# Patient Record
Sex: FEMALE | Race: ASIAN | URBAN - METROPOLITAN AREA
[De-identification: names, ages, dates, MRNs, and addresses within clinical notes are randomized per-mention and may not be internally consistent; named-entity substitution may affect disease eponyms.]

---

## 2017-01-05 ENCOUNTER — OFFICE VISIT (OUTPATIENT)
Dept: DERMATOLOGY | Facility: CLINIC | Age: 22
End: 2017-01-05

## 2017-01-05 DIAGNOSIS — L70.0 ACNE VULGARIS: Primary | ICD-10-CM

## 2017-01-05 RX ORDER — TRETINOIN 0.25 MG/G
CREAM TOPICAL
Qty: 45 G | Refills: 3 | Status: SHIPPED | OUTPATIENT
Start: 2017-01-05

## 2017-01-05 RX ORDER — AMOXICILLIN 500 MG/1
500 CAPSULE ORAL 2 TIMES DAILY
Qty: 60 CAPSULE | Refills: 2 | Status: SHIPPED | OUTPATIENT
Start: 2017-01-05 | End: 2017-04-13

## 2017-01-05 RX ORDER — CLINDAMYCIN PHOSPHATE 10 UG/ML
LOTION TOPICAL
Qty: 60 ML | Refills: 11 | Status: SHIPPED | OUTPATIENT
Start: 2017-01-05

## 2017-01-05 ASSESSMENT — PAIN SCALES - GENERAL: PAINLEVEL: MODERATE PAIN (5)

## 2017-01-05 NOTE — Clinical Note
Date:January 9, 2017      Patient was self referred, no letter generated. Do not send.        [facility] Information

## 2017-01-05 NOTE — NURSING NOTE
Dermatology Rooming Note    Xiang Pavon's goals for this visit include:   Chief Complaint   Patient presents with     Derm Problem     Xiang is here today for acne.      Prudence Vigil MA

## 2017-01-05 NOTE — Clinical Note
"1/5/2017       RE: Xiang Pavon  [address]  [address]  [facility] [address]     Dear Colleague,    Thank you for referring your patient, Xiang Pavon, to the [facility]ERMATOLOGY [facility]. Please see a copy of my visit note below.    [facility] Dermatology Note    Dermatology Problem List:  -Acne Vulgaris   - clindamycin lotion QAM   - BP wash 5% once daily at night   - tretinoin 0.025% cream QPM   - amoxicillin 500 mg BID     Encounter Date: Jan 5, 2017    CC:   Chief Complaint   Patient presents with     Derm Problem     Xiang is here today for acne.        History of Present Illness:  Xiang Pavon is a 21 year old who presents for evaluation for acne vulgaris.  Patient states that she has had acne for several years, however for the past year it has been much worse.  She states that she used tretinoin 0.05% cream once a day at night, she just started doxycyline 100 mg twice daily.  She states that she only took the doxycyline for 2-3 weeks, but she had dizziness and now has stopped taking doxycyline.  She has used some over the counter medications including an BP wash which she thinks that it is very drying.  She states that her acne is sometimes worse when she has her period.  She thinks that today is a \"good day.\" She states it is only involving her face, not on her chest, back, or jaw line.  She has no family history of acne.  She states that she has no irregular periods.      Past Medical History:   History reviewed. No pertinent past medical history.  History reviewed. No pertinent past surgical history.    Social History:  The patient is a student at the [employer], undergraduate. The patient denies use of tanning beds.    Family History:  No family history of significant acne.    Medications:  No current outpatient prescriptions on file.        No Known Allergies    Review of Systems:  -Skin/Heme New Pt: The patient denies frequent sun exposure. " The patient denies excessive scarring or problems healing except as per HPI. The patient denies excessive bleeding.  -Constitutional: The patient denies fatigue, fevers, chills, unintended weight loss, and night sweats.  -HEENT: Patient denies nonhealing oral sores.    Physical exam:  There were no vitals taken for this visit.  - This is a well developed, well-nourished female in no acute distress, in a pleasant mood.    - Holden's skin type 3-4  - Localized skin examination was performed of  Face, neck, upper chest  and findings as follows:  - multiple inflammatory papules and pustules scattered on the face, forehead, cheeks, and chin, examination revealed no involvement of the jaw as well as the neck and the chest   - No other lesions of concern on areas examined.     Impression/Plan:  1. Acne Vulgaris - moderate to severe nodulocystic acne.  Talked to patient about side effects of medication, counseled her on the following regimen, will have her follow up in 2-3 months.  Consider possibly starting accutane if patient is re-calcitrant.   Morning:  - use clindamycin lotion once daily every morning to entire face (rx)  - take amoxicillin 500 mg twice daily (once in the morning and once at night) (pill, rx)  Evening:  - use Benzoyl peroxide wash (Acne free) 5%  - take amoxicillin again (this is the pill)  - tretinoin 0.025% cream - Apply pea sized amount of medication to face every other day at night for one week, then increase to nightly.  Can apply moisturizer after to decrease irritation.      - after 2 months - decrease amoxicillin to only one time a day  - 3 months follow up with me.    Follow-up 3 months     staffed the patient.    Staff Involved:  Resident(Carmel Tavarez)/Staff(as above)    I have personally examined this patient and agree with Dr. Tavarez's documentation and plan of care. I have reviewed and amended the resident's note above. The documentation accurately reflects my clinical  observations, diagnoses, treatment and follow-up plans.     Marija Bangura MD  Dermatology Staff      Again, thank you for allowing me to participate in the care of your patient.      Sincerely,    Carmel Tavarez MD

## 2017-01-05 NOTE — PROGRESS NOTES
"[facility] Dermatology Note    Dermatology Problem List:  -Acne Vulgaris   - clindamycin lotion QAM   - BP wash 5% once daily at night   - tretinoin 0.025% cream QPM   - amoxicillin 500 mg BID     Encounter Date: Jan 5, 2017    CC:   Chief Complaint   Patient presents with     Derm Problem     Xiang is here today for acne.        History of Present Illness:  Xiang Pavon is a 21 year old who presents for evaluation for acne vulgaris.  Patient states that she has had acne for several years, however for the past year it has been much worse.  She states that she used tretinoin 0.05% cream once a day at night, she just started doxycyline 100 mg twice daily.  She states that she only took the doxycyline for 2-3 weeks, but she had dizziness and now has stopped taking doxycyline.  She has used some over the counter medications including an BP wash which she thinks that it is very drying.  She states that her acne is sometimes worse when she has her period.  She thinks that today is a \"good day.\" She states it is only involving her face, not on her chest, back, or jaw line.  She has no family history of acne.  She states that she has no irregular periods.      Past Medical History:   History reviewed. No pertinent past medical history.  History reviewed. No pertinent past surgical history.    Social History:  The patient is a student at the [employer], undergraduate. The patient denies use of tanning beds.    Family History:  No family history of significant acne.    Medications:  No current outpatient prescriptions on file.        No Known Allergies    Review of Systems:  -Skin/Heme New Pt: The patient denies frequent sun exposure. The patient denies excessive scarring or problems healing except as per HPI. The patient denies excessive bleeding.  -Constitutional: The patient denies fatigue, fevers, chills, unintended weight loss, and night sweats.  -HEENT: Patient denies nonhealing oral sores.    Physical " exam:  There were no vitals taken for this visit.  - This is a well developed, well-nourished female in no acute distress, in a pleasant mood.    - Holden's skin type 3-4  - Localized skin examination was performed of  Face, neck, upper chest  and findings as follows:  - multiple inflammatory papules and pustules scattered on the face, forehead, cheeks, and chin, examination revealed no involvement of the jaw as well as the neck and the chest   - No other lesions of concern on areas examined.     Impression/Plan:  1. Acne Vulgaris - moderate to severe nodulocystic acne.  Talked to patient about side effects of medication, counseled her on the following regimen, will have her follow up in 2-3 months.  Consider possibly starting accutane if patient is re-calcitrant.   Morning:  - use clindamycin lotion once daily every morning to entire face (rx)  - take amoxicillin 500 mg twice daily (once in the morning and once at night) (pill, rx)  Evening:  - use Benzoyl peroxide wash (Acne free) 5%  - take amoxicillin again (this is the pill)  - tretinoin 0.025% cream - Apply pea sized amount of medication to face every other day at night for one week, then increase to nightly.  Can apply moisturizer after to decrease irritation.      - after 2 months - decrease amoxicillin to only one time a day  - 3 months follow up with me.    Follow-up 3 months     staffed the patient.    Staff Involved:  Resident(Carmel Tavarez)/Staff(as above)    I have personally examined this patient and agree with Dr. Tavarez's documentation and plan of care. I have reviewed and amended the resident's note above. The documentation accurately reflects my clinical observations, diagnoses, treatment and follow-up plans.     Marija Bangura MD  Dermatology Staff

## 2017-01-05 NOTE — PATIENT INSTRUCTIONS
For Acne  Morning:  - use clindamycin lotion once daily every morning to entire face (rx)  - take amoxicillin 500 mg twice daily (once in the morning and once at night) (pill, rx)    Evening:  - use Benzoyl peroxide wash (Acne free) 5%  - take amoxicillin again (this is the pill)  - tretinoin 0.025% cream - Apply pea sized amount of medication to face every other day at night for one week, then increase to nightly.  Can apply moisturizer after to decrease irritation.      - after 2 months - decrease amoxicillin to only one time a day  - 3 months follow up with me.

## 2017-01-06 PROBLEM — L70.0 ACNE VULGARIS: Status: ACTIVE | Noted: 2017-01-06

## 2017-04-13 ENCOUNTER — OFFICE VISIT (OUTPATIENT)
Dept: DERMATOLOGY | Facility: CLINIC | Age: 22
End: 2017-04-13

## 2017-04-13 DIAGNOSIS — L70.0 ACNE VULGARIS: Primary | ICD-10-CM

## 2017-04-13 RX ORDER — AMOXICILLIN 500 MG/1
500 CAPSULE ORAL 2 TIMES DAILY
Qty: 60 CAPSULE | Refills: 2 | Status: SHIPPED | OUTPATIENT
Start: 2017-04-13

## 2017-04-13 RX ORDER — TRETINOIN 0.5 MG/G
CREAM TOPICAL AT BEDTIME
Qty: 20 G | Refills: 3 | Status: SHIPPED | OUTPATIENT
Start: 2017-04-13

## 2017-04-13 ASSESSMENT — PAIN SCALES - GENERAL: PAINLEVEL: NO PAIN (0)

## 2017-04-13 NOTE — NURSING NOTE
"Dermatology Rooming Note    Xiang Pavon's goals for this visit include:   Chief Complaint   Patient presents with     Derm Problem     Xiang states she is here to follow up on her acne. She states that is doing \" a little bit better\"     Kenia Rivera CMA  "

## 2017-04-13 NOTE — LETTER
4/13/2017       RE: Xiang Pavon  [address]  [address]  [facility] [address]     Dear Colleague,    Thank you for referring your patient, Xiang Pavon, to the [facility] DERMATOLOGY at [facility]. Please see a copy of my visit note below.    No notes on file    Again, thank you for allowing me to participate in the care of your patient.      Sincerely,    Sully Seymour MD, MD

## 2017-04-13 NOTE — PROGRESS NOTES
"[facility] Dermatology Note      Dermatology Problem List:  1. Acne vulgaris, completed 3 month course of amoxicillin 500 mg po bid with some residual atrophic scarring. No nodulocystic type acne   2. Oily skin    Encounter Date: Apr 13, 2017    CC:   Chief Complaint   Patient presents with     Derm Problem     Xiang states she is here to follow up on her acne. She states that is doing \" a little bit better\"       History of Present Illness:  Ms. Xiang Pavon is a 21 year old female who presents as a follow-up for acne. The patient was last seen 3 months ago when she was started on amoxicillin 500 mg po bid in addition to her topical regimen of clindamycin lotion in AM, BPO wash and tretinoin 0.025% cream at night time. She ran out of her antibiotics on Tuesday. She states that she did not know she was supposed to taper her antibiotics. Tolerates her topical medicine but still has oily shiny skin. No side effects with any of her medications     Past Medical History:   Patient Active Problem List   Diagnosis     Acne vulgaris     Social History:  The patient is currently in school for human resources    Family History:  Not discussed today     Medications:  Current Outpatient Prescriptions   Medication Sig Dispense Refill     tretinoin (RETIN-A) 0.05 % cream Apply topically At Bedtime Apply pea size amount over face 20 g 3     amoxicillin (AMOXIL) 500 MG capsule Take 1 capsule (500 mg) by mouth 2 times daily 60 capsule 2     clindamycin (CLEOCIN T) 1 % lotion Apply topically once every morning 60 mL 11     tretinoin (RETIN-A) 0.025 % cream Apply pea sized amount of medication to face every other day at night for one week, then increase to nightly. 45 g 3        No Known Allergies    Review of Systems:  -Constitutional: The patient denies fatigue, fevers, chills, unintended weight loss, and night sweats.  Feeling usual state of health   -Skin: As above in HPI. No additional skin " concerns.    Physical exam:  Vitals: There were no vitals taken for this visit.  GEN: This is a well developed, well-nourished female in no acute distress, in a pleasant mood.    SKIN: Sun-exposed skin, which includes the head/face, neck, both arms, digits, and/or nails was examined.   -Acneiform scarring is noted on the bilateral cheeks as atrophic pink macules .   - chest and back are clear  - very subtle comedones on forehead   -No other lesions of concern on areas examined.     Impression/Plan:  1. Acne vulgaris, improved     D/c amoxicillin given there is no evidence of inflammatory papules or pustules. There is also no nodulocystic acne on exam today     Continue topical regimen of clindamycin lotion in AM, BPO wash daily    Increase tretinoin concentration to 0.05% cream given oily skin and some persistent comedones     CC PCP/amari on close of this encounter.  Follow-up in 6 months, earlier for new or changing lesions.       Dr. White staffed the patient.    Staff Involved:  Resident(Sully Seymour MD)/Staff(as above)

## 2017-04-13 NOTE — LETTER
"4/13/2017      RE: Xiang Pavon  [address]  [address]  [facility] [address]       [facility] Dermatology Note      Dermatology Problem List:  1. Acne vulgaris, completed 3 month course of amoxicillin 500 mg po bid with some residual atrophic scarring. No nodulocystic type acne   2. Oily skin    Encounter Date: Apr 13, 2017    CC:   Chief Complaint   Patient presents with     Derm Problem     Xiang states she is here to follow up on her acne. She states that is doing \" a little bit better\"       History of Present Illness:  Ms. Xiang Pavon is a 21 year old female who presents as a follow-up for acne. The patient was last seen 3 months ago when she was started on amoxicillin 500 mg po bid in addition to her topical regimen of clindamycin lotion in AM, BPO wash and tretinoin 0.025% cream at night time. She ran out of her antibiotics on Tuesday. She states that she did not know she was supposed to taper her antibiotics. Tolerates her topical medicine but still has oily shiny skin. No side effects with any of her medications     Past Medical History:   Patient Active Problem List   Diagnosis     Acne vulgaris     Social History:  The patient is currently in school for human resources    Family History:  Not discussed today     Medications:  Current Outpatient Prescriptions   Medication Sig Dispense Refill     tretinoin (RETIN-A) 0.05 % cream Apply topically At Bedtime Apply pea size amount over face 20 g 3     amoxicillin (AMOXIL) 500 MG capsule Take 1 capsule (500 mg) by mouth 2 times daily 60 capsule 2     clindamycin (CLEOCIN T) 1 % lotion Apply topically once every morning 60 mL 11     tretinoin (RETIN-A) 0.025 % cream Apply pea sized amount of medication to face every other day at night for one week, then increase to nightly. 45 g 3        No Known Allergies    Review of Systems:  -Constitutional: The patient denies fatigue, fevers, chills, unintended weight loss, and night " sweats.  Feeling usual state of health   -Skin: As above in HPI. No additional skin concerns.    Physical exam:  Vitals: There were no vitals taken for this visit.  GEN: This is a well developed, well-nourished female in no acute distress, in a pleasant mood.    SKIN: Sun-exposed skin, which includes the head/face, neck, both arms, digits, and/or nails was examined.   -Acneiform scarring is noted on the bilateral cheeks as atrophic pink macules .   - chest and back are clear  - very subtle comedones on forehead   -No other lesions of concern on areas examined.     Impression/Plan:  1. Acne vulgaris, improved     D/c amoxicillin given there is no evidence of inflammatory papules or pustules. There is also no nodulocystic acne on exam today     Continue topical regimen of clindamycin lotion in AM, BPO wash daily    Increase tretinoin concentration to 0.05% cream given oily skin and some persistent comedones     CC PCP/amari on close of this encounter.  Follow-up in 6 months, earlier for new or changing lesions.       Dr. White staffed the patient.    Staff Involved:  Resident(Sully Seymour MD)/Staff(as above)    Sully Seymour MD

## 2017-04-25 NOTE — PROGRESS NOTES
I talked with and examined Xiang Pavon and I agree with the assessment and the plan. CASH White MD.

## 2017-05-30 ENCOUNTER — OFFICE VISIT (OUTPATIENT)
Dept: OPHTHALMOLOGY | Facility: CLINIC | Age: 22
End: 2017-05-30

## 2017-05-30 DIAGNOSIS — Q10.3 EPICANTHAL FOLDS: Primary | ICD-10-CM

## 2017-05-30 DIAGNOSIS — H40.053 BORDERLINE GLAUCOMA WITH OCULAR HYPERTENSION, BILATERAL: ICD-10-CM

## 2017-05-30 DIAGNOSIS — H02.402 PTOSIS OF EYELID, LEFT: ICD-10-CM

## 2017-05-30 ASSESSMENT — VISUAL ACUITY
OD_CC: 20/30
OS_CC: 20/40
METHOD: SNELLEN - LINEAR
OS_CC: J1+
CORRECTION_TYPE: GLASSES
OD_CC: J1+

## 2017-05-30 ASSESSMENT — REFRACTION_WEARINGRX
OS_SPHERE: -6.50
OD_AXIS: 070
OS_AXIS: 103
OD_SPHERE: -6.50
OS_CYLINDER: +0.75
SPECS_TYPE: SVL
OD_CYLINDER: +1.00

## 2017-05-30 ASSESSMENT — REFRACTION_MANIFEST
OS_SPHERE: -6.75
OD_AXIS: 058
OS_CYLINDER: +0.50
OD_CYLINDER: +0.75
OS_AXIS: 103
OD_SPHERE: -7.50

## 2017-05-30 ASSESSMENT — CONF VISUAL FIELD
METHOD: COUNTING FINGERS
OS_NORMAL: 1

## 2017-05-30 ASSESSMENT — TONOMETRY
IOP_METHOD: ICARE
OD_IOP_MMHG: 25
OS_IOP_MMHG: 20
IOP_METHOD: APPLANATION
OD_IOP_MMHG: 22
OS_IOP_MMHG: 22

## 2017-05-30 ASSESSMENT — SLIT LAMP EXAM - LIDS: COMMENTS: PROMINENT EPICANTHAL FOLD

## 2017-05-30 ASSESSMENT — EXTERNAL EXAM - LEFT EYE: OS_EXAM: NORMAL

## 2017-05-30 ASSESSMENT — CUP TO DISC RATIO
OS_RATIO: 0.1
OD_RATIO: 0.1

## 2017-05-30 ASSESSMENT — EXTERNAL EXAM - RIGHT EYE: OD_EXAM: NORMAL

## 2017-05-30 NOTE — NURSING NOTE
Chief Complaints and History of Present Illnesses   Patient presents with     Annual Eye Exam     Patient states that she would like to know if her eyes can be aligned. She states that eyes have always been close together, but does not experience double vision.     HPI    Affected eye(s):  Both   Symptoms:     No blurred vision   No decreased vision      Frequency:  Constant       Do you have eye pain now?:  No      Comments:  Patient states that she would like to know if her eyes can be aligned. She states that eyes have always been close together, but does not experience double vision. Her glasses are a few years old but working well for her. Denies eye pain or irritation. She sometimes wears CLs and will use rewetting drops, otherwise no eye drops.    Luana Thomason COT 8:47 AM May 30, 2017

## 2017-05-30 NOTE — PROGRESS NOTES
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 No treatment indicated. Monitor annually.    (H02.402) Ptosis of eyelid, left  Comment: Patient denies noticing  Plan:  Monitor. Patient not bothered cosmetically.    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 Consider baseline OCT.      Complete documentation of historical and exam elements from today's encounter can  be found in the full encounter summary report (not reduplicated in this progress  note). I personally obtained the chief complaint(s) and history of present illness. I  confirmed and edited as necessary the review of systems, past medical/surgical  history, family history, social history, and examination findings as documented by  others; and I examined the patient myself. I personally reviewed the relevant tests,  images, and reports as documented above. I formulated and edited as necessary the  assessment and plan and discussed the findings and management plan with the  patient and family.    Pardeep Short OD, FAAO

## 2017-08-06 ENCOUNTER — HOSPITAL ENCOUNTER (EMERGENCY)
Facility: CLINIC | Age: 22
Discharge: HOME | End: 2017-08-06
Attending: EMERGENCY MEDICINE | Admitting: EMERGENCY MEDICINE
Payer: COMMERCIAL

## 2017-08-06 VITALS
SYSTOLIC BLOOD PRESSURE: 125 MMHG | RESPIRATION RATE: 16 BRPM | HEIGHT: 63 IN | TEMPERATURE: 98.9 F | HEART RATE: 78 BPM | BODY MASS INDEX: 21.26 KG/M2 | OXYGEN SATURATION: 98 % | WEIGHT: 120 LBS | DIASTOLIC BLOOD PRESSURE: 82 MMHG

## 2017-08-06 DIAGNOSIS — T74.21XA SEXUAL ASSAULT OF ADULT, INITIAL ENCOUNTER: ICD-10-CM

## 2017-08-06 DIAGNOSIS — T76.21XA SUSPECTED ADULT SEXUAL ABUSE, INITIAL ENCOUNTER: ICD-10-CM

## 2017-08-06 LAB
HCG UR QL: NEGATIVE
INTERNAL QC OK POCT: YES

## 2017-08-06 PROCEDURE — 81025 URINE PREGNANCY TEST: CPT | Performed by: EMERGENCY MEDICINE

## 2017-08-06 PROCEDURE — 99284 EMERGENCY DEPT VISIT MOD MDM: CPT | Mod: Z6 | Performed by: EMERGENCY MEDICINE

## 2017-08-06 PROCEDURE — 96372 THER/PROPH/DIAG INJ SC/IM: CPT | Performed by: EMERGENCY MEDICINE

## 2017-08-06 PROCEDURE — 25000125 ZZHC RX 250: Performed by: EMERGENCY MEDICINE

## 2017-08-06 PROCEDURE — 25000132 ZZH RX MED GY IP 250 OP 250 PS 637: Performed by: EMERGENCY MEDICINE

## 2017-08-06 PROCEDURE — 25000128 H RX IP 250 OP 636: Performed by: EMERGENCY MEDICINE

## 2017-08-06 PROCEDURE — 99285 EMERGENCY DEPT VISIT HI MDM: CPT | Mod: 25 | Performed by: EMERGENCY MEDICINE

## 2017-08-06 RX ORDER — ONDANSETRON 4 MG/1
4 TABLET, ORALLY DISINTEGRATING ORAL ONCE
Status: COMPLETED | OUTPATIENT
Start: 2017-08-06 | End: 2017-08-06

## 2017-08-06 RX ORDER — AZITHROMYCIN 250 MG/1
1000 TABLET, FILM COATED ORAL ONCE
Status: COMPLETED | OUTPATIENT
Start: 2017-08-06 | End: 2017-08-06

## 2017-08-06 RX ORDER — METRONIDAZOLE 500 MG/1
2000 TABLET ORAL ONCE
Qty: 4 TABLET | Refills: 0 | Status: SHIPPED | OUTPATIENT
Start: 2017-08-06 | End: 2017-08-06

## 2017-08-06 RX ORDER — CEFTRIAXONE SODIUM 250 MG
250 VIAL (EA) INJECTION ONCE
Status: COMPLETED | OUTPATIENT
Start: 2017-08-06 | End: 2017-08-06

## 2017-08-06 RX ORDER — LEVONORGESTREL 1.5 MG/1
1.5 TABLET ORAL ONCE
Status: COMPLETED | OUTPATIENT
Start: 2017-08-06 | End: 2017-08-06

## 2017-08-06 RX ADMIN — ONDANSETRON 4 MG: 4 TABLET, ORALLY DISINTEGRATING ORAL at 15:30

## 2017-08-06 RX ADMIN — LEVONORGESTREL 1.5 MG: 1.5 TABLET ORAL at 14:52

## 2017-08-06 RX ADMIN — CEFTRIAXONE SODIUM 250 MG: 250 INJECTION, POWDER, FOR SOLUTION INTRAMUSCULAR; INTRAVENOUS at 14:47

## 2017-08-06 RX ADMIN — AZITHROMYCIN 1000 MG: 250 TABLET, FILM COATED ORAL at 14:48

## 2017-08-06 ASSESSMENT — ENCOUNTER SYMPTOMS
EYE REDNESS: 0
SHORTNESS OF BREATH: 0
ABDOMINAL PAIN: 0
ARTHRALGIAS: 0
DIFFICULTY URINATING: 0
HEADACHES: 0
COLOR CHANGE: 0
CONFUSION: 0
FEVER: 0
NECK STIFFNESS: 0
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
## 2017-08-06 NOTE — ED AVS SNAPSHOT
[facility], [place], Emergency Department    [address] [place] [address]    Phone:  [phone]                                       Xiang Pavon   MRN: [id number]    Department:  [facility], Emergency Department   Date of Visit:  8/6/2017           After Visit Summary Signature Page     I have received my discharge instructions, and my questions have been answered. I have discussed any challenges I see with this plan with the nurse or doctor.    ..........................................................................................................................................  Patient/Patient Representative Signature      ..........................................................................................................................................  Patient Representative Print Name and Relationship to Patient    ..................................................               ................................................  Date                                            Time    ..........................................................................................................................................  Reviewed by Signature/Title    ...................................................              ..............................................  Date                                                            Time
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
## 2017-09-04 ENCOUNTER — NURSE TRIAGE (OUTPATIENT)
Dept: NURSING | Facility: CLINIC | Age: 22
End: 2017-09-04

## 2017-09-04 ENCOUNTER — HOSPITAL ENCOUNTER (EMERGENCY)
Facility: CLINIC | Age: 22
Discharge: HOME | End: 2017-09-04
Attending: EMERGENCY MEDICINE | Admitting: EMERGENCY MEDICINE
Payer: COMMERCIAL

## 2017-09-04 VITALS
WEIGHT: 110.23 LBS | RESPIRATION RATE: 12 BRPM | OXYGEN SATURATION: 98 % | DIASTOLIC BLOOD PRESSURE: 49 MMHG | BODY MASS INDEX: 19.53 KG/M2 | SYSTOLIC BLOOD PRESSURE: 116 MMHG | HEART RATE: 70 BPM | TEMPERATURE: 98.2 F

## 2017-09-04 DIAGNOSIS — R23.8 SKIN IRRITATION: ICD-10-CM

## 2017-09-04 DIAGNOSIS — L98.9 DISORDER OF SKIN OR SUBCUTANEOUS TISSUE: ICD-10-CM

## 2017-09-04 PROCEDURE — 99282 EMERGENCY DEPT VISIT SF MDM: CPT | Mod: Z6 | Performed by: EMERGENCY MEDICINE

## 2017-09-04 PROCEDURE — 99282 EMERGENCY DEPT VISIT SF MDM: CPT | Performed by: EMERGENCY MEDICINE

## 2017-09-04 ASSESSMENT — ENCOUNTER SYMPTOMS
SHORTNESS OF BREATH: 0
ABDOMINAL PAIN: 0
WOUND: 0
FEVER: 0

## 2017-09-04 NOTE — ED PROVIDER NOTES
History     Chief Complaint   Patient presents with     Abrasion     HPI  Xiang Pavon is a 22 year old female who presents for evaluation of a dog scratch. The patient reports that yesterday her roommate's friend's dog jumped on her twice and inadvertently scratched her left knee and left arm very superficially. She subsequently some redness and irritation in these areas. No bleeding or skin break. The redness has resolved but she has continued to have discomfort of the left knee. The dog is domesticated and patient believes is vaccinated.     No current facility-administered medications for this encounter.      Current Outpatient Prescriptions   Medication     tretinoin (RETIN-A) 0.05 % cream     amoxicillin (AMOXIL) 500 MG capsule     clindamycin (CLEOCIN T) 1 % lotion     tretinoin (RETIN-A) 0.025 % cream     Past Medical History:   Diagnosis Date     Acne        History reviewed. No pertinent surgical history.    Family History   Problem Relation Age of Onset     Glaucoma No family hx of      Macular Degeneration No family hx of      Strabismus No family hx of        Social History   Substance Use Topics     Smoking status: Never Smoker     Smokeless tobacco: Never Used     Alcohol use Yes      Comment: rarely      No Known Allergies    I have reviewed the Medications, Allergies, Past Medical and Surgical History, and Social History in the Epic system.    Review of Systems   Constitutional: Negative for fever.   Respiratory: Negative for shortness of breath.    Cardiovascular: Negative for chest pain.   Gastrointestinal: Negative for abdominal pain.   Skin: Negative for wound.   All other systems reviewed and are negative.      Physical Exam   BP: 116/49  Pulse: 70  Temp: 98.2  F (36.8  C)  Resp: 16  Weight: 50 kg (110 lb 3.7 oz)  SpO2: 98 %  Physical Exam   Constitutional: She is oriented to person, place, and time. She appears well-developed and well-nourished.   HENT:   Head: Normocephalic and atraumatic.    Neck: Normal range of motion.   Cardiovascular: Normal rate, regular rhythm and normal heart sounds.    Pulmonary/Chest: Effort normal and breath sounds normal. No respiratory distress.   Abdominal: Soft. She exhibits no distension. There is no tenderness. There is no rebound.   Musculoskeletal: She exhibits no tenderness.   Neurological: She is alert and oriented to person, place, and time.   Skin: Skin is warm and dry.   Left knee--no visible scratch or bite marks; nontender to touch; no fluctuance, no induration; no abrasion   Psychiatric: She has a normal mood and affect. Her behavior is normal. Thought content normal.       ED Course     ED Course     Procedures             Critical Care time:  none             Labs Ordered and Resulted from Time of ED Arrival Up to the Time of Departure from the ED - No data to display         Assessments & Plan (with Medical Decision Making)     22-year-old female presents to the ER due to some irritation of her left knee (calf region).  Patient became concerned after being scratched by her friend s dog who jumped on her. Patient here has no breakdown of skin in that region.  No scratches, no bites, skin is well appearing, no signs of infection or inflammation.  At this time I do not recommend rabies vaccines.  Patient has not had any skin penetration and the dog is immunized as well.  No indications for sutures as well.  No indication for antibiotics as well as there is no indication of infection.  Recommended patient put some lotion on the area.  If this does become infected or she develops other symptoms, she is encouraged to return to the ER.    This part of the document was transcribed by Vladislav Dsouza, Medical Scribe.       I have reviewed the nursing notes.    I have reviewed the findings, diagnosis, plan and need for follow up with the patient.    New Prescriptions    No medications on file       Final diagnoses:   Skin irritation     I, Sawyer Mayers, am  serving as a trained medical scribe to document services personally performed by Hannah Strange MD, based on the provider's statements to me.      I, Hannah Strange MD, was physically present and have reviewed and verified the accuracy of this note documented by Sawyer Mayers.    9/4/2017   [facility], [place], EMERGENCY DEPARTMENT     Hannah Strange MD  09/04/17 1937

## 2017-09-04 NOTE — TELEPHONE ENCOUNTER
Reason for Disposition    [1] Last tetanus shot > 5 years ago AND [2] DIRTY scrape    Additional Information    Negative: [1] Major bleeding (e.g., actively dripping or spurting) AND [2] can't be stopped    Negative: Sounds like a life-threatening emergency to the triager    Negative: [1] Bleeding AND [2] won't stop after 10 minutes of direct pressure (using correct technique)    Negative: Skin is split open or gaping  (or length > 1/2 inch or 12 mm)    Negative: Skin loss goes very deep (e.g., can see bones or tendons)    Negative: Skin loss involves more than 10% of surface area (Note: the palm of the hand = 1%)    Negative: [1] Dirt in the wound AND [2] not removed with 15 minutes of scrubbing    Negative: Sounds like a serious injury to the triager    Negative: [1] SEVERE pain AND [2] not improved 2 hours after pain medicine    Negative: [1] Looks infected AND [2] large red area (>2 inches or 5 cm) or streak    Negative: [1] Fever AND [2] bright red area or streak    Negative: Suspicious history for the injury    Negative: [1] Looks infected (spreading redness, pus) AND [2] no fever    Protocols used: SCRAPES-ADULT-JACOB [place] calls and says that her roommate's friend's puppy scratched her left knee and left thigh, today. Left thigh was scratched at 8 PM and left thigh was scratched at 10:50 PM. Pt. Says that the scratches did not bleed and that she can barely see where the puppy scratched her skin. Pt. Wants to know if she is going to need a rabies shot, from the scratch. RN answered pt's question and also advised pt. To address the rabies shot, with her Dr., when she sees the Dr. Pt. Says that she will do this. Pt. says that she cleaned the scratch sites with warm water.

## 2017-09-04 NOTE — DISCHARGE INSTRUCTIONS
You do not have signs of infection from your scratches.   You do not have any risk for rabies.   Put lotion on the skin.   Return to the ER if symptoms worsen.

## 2017-09-04 NOTE — TELEPHONE ENCOUNTER
Reason for Disposition    [1] Non-bite body fluid contact (e.g., saliva, brain) AND [2] onto open cut/wound or mucous membranes AND[3] animal at high-risk for RABIES (e.g., bat, raccoon, moreno, skunk, coyote, other carnivores)    Additional Information    Negative: [1] Major bleeding (e.g., actively dripping or spurting) AND [2] can't be stopped    Negative: Sounds like a life-threatening emergency to the triager    Negative: Snake bite    Negative: Bite, wound, or sting from fish    Negative: [1] Any break in skin (e.g., cut, puncture or scratch) AND[2] wild animal at risk for RABIES (e.g., bat, raccoon, moreno, skunk, coyote, other carnivores)    Negative: [1] Any break in skin (e.g., cut, puncture or scratch) AND[2] dog, cat, or ferret at risk for RABIES (e.g., sick, stray, unprovoked bite, developing country)    Negative: [1] Any break in skin (e.g., cut, puncture or scratch) AND[2] monkey    Negative: [1] Cut (length > 1/8 inch or 3 mm) or skin tear AND[2] any animal    Negative: [1] Bleeding AND [2] won't stop after 10 minutes of direct pressure (using correct technique)    Negative: Description of bite sounds severe to the triager    Protocols used: ANIMAL BITE-ADULT-  Patient states she was scratched by a roommate friends dog and is concerned about rabies.

## 2017-09-04 NOTE — ED AVS SNAPSHOT
[facility], Emergency Department    [address] [facility] [address]    Phone:  [phone]                                       Xiang Pavon   MRN: [id number]    Department:  [facility], Emergency Department   Date of Visit:  9/4/2017           Patient Information     Date Of Birth          1995        Your diagnoses for this visit were:     Skin irritation        You were seen by Hannah Strange MD.        Discharge Instructions       You do not have signs of infection from your scratches.   You do not have any risk for rabies.   Put lotion on the skin.   Return to the ER if symptoms worsen.     24 Hour Appointment Hotline       To make an appointment at any [facility] clinic, call [phone] ([phone]). If you don't have a family doctor or clinic, we will help you find one. [facility] clinics are conveniently located to serve the needs of you and your family.             Review of your medicines      Our records show that you are taking the medicines listed below. If these are incorrect, please call your family doctor or clinic.        Dose / Directions Last dose taken    amoxicillin 500 MG capsule   Commonly known as:  AMOXIL   Dose:  500 mg   Quantity:  60 capsule        Take 1 capsule (500 mg) by mouth 2 times daily   Refills:  2        clindamycin 1 % lotion   Commonly known as:  CLEOCIN T   Quantity:  60 mL        Apply topically once every morning   Refills:  11        * tretinoin 0.025 % cream   Commonly known as:  RETIN-A   Quantity:  45 g        Apply pea sized amount of medication to face every other day at night for one week, then increase to nightly.   Refills:  3        * tretinoin 0.05 % cream   Commonly known as:  RETIN-A   Quantity:  20 g        Apply topically At Bedtime Apply pea size amount over face   Refills:  3        * Notice:  This list has 2 medication(s) that are the same as other medications prescribed for you. Read the directions carefully, and ask your  "doctor or other care provider to review them with you.            Orders Needing Specimen Collection     None      Pending Results     No orders found from 2017 to 2017.            Pending Culture Results     No orders found from 2017 to 2017.            Pending Results Instructions     If you had any lab results that were not finalized at the time of your Discharge, you can call the ED Lab Result RN at [phone]. You will be contacted by this team for any positive Lab results or changes in treatment. The nurses are available 7 days a week from 10A to 6:30P.  You can leave a message 24 hours per day and they will return your call.        Thank you for choosing [facility]       Thank you for choosing [facility] for your care. Our goal is always to provide you with excellent care. Hearing back from our patients is one way we can continue to improve our services. Please take a few minutes to complete the written survey that you may receive in the mail after you visit with us. Thank you!        [employer] Information     [employer] lets you send messages to your doctor, view your test results, renew your prescriptions, schedule appointments and more. To sign up, go to [email or web address] . Click on \"Log in\" on the left side of the screen, which will take you to the Welcome page. Then click on \"Sign up Now\" on the right side of the page.     You will be asked to enter the access code listed below, as well as some personal information. Please follow the directions to create your username and password.     Your access code is: [email or web address]  Expires: 2017  2:59 PM     Your access code will  in 90 days. If you need help or a new code, please call your [facility] clinic or [phone].        Care EveryWhere ID     This is your Care EveryWhere ID. This could be used by other organizations to access your [facility] medical records  [id number]        Equal Access to Services     MARCOS WILLS AH: Sagar galan " mike Eid, nichole becker, mary noguera, zahida bueno. So [facility] [phone].    ATENCIÓN: Si habla español, tiene a ibrahim disposición servicios gratuitos de asistencia lingüística. Llame al [phone].    We comply with applicable federal civil rights laws and [place] laws. We do not discriminate on the basis of race, color, national origin, age, disability sex, sexual orientation or gender identity.            After Visit Summary       This is your record. Keep this with you and show to your community pharmacist(s) and doctor(s) at your next visit.

## 2021-02-19 NOTE — ED AVS SNAPSHOT
Patient:   GERTRUDIS MESA            MRN: [id number]            FIN: [id number]               Age:   62 years     Sex:  Female     :  1956   Associated Diagnoses:   None   Author:   Jeff Pascual MD      Basic Information   Addendum: Pertinent history: Patient was endorsed to me at shift change with a CT scan of the abdomen pelvis as well as a UA pending.  Briefly the CT report was being read as \"pyelonephritis and ureteritis may be considered.\",  Also being read as \"naty fat changes are also noted along the distal pancreas and in the right paracolic gutter.  No filling defects in the venous system however naty fat changes along the mesenteric veins in the left lower quadrant and right abdomen raise possibility of multifocal venous thrombophlebitis.\"  In regards to the reading of pyelonephritis, UA does not reveal evidence of infection, I did send urine for a culture, also ordered blood cultures ×2.  I discussed patient in the CT report with admitting, Dr. Thiago House, agrees with giving patient an IV dose of ceftriaxone and requested that Dr. Cueva be put on consult.  I discussed patient with Dr. Cueva, who requested a d-dimer be ordered as well as Doppler ultrasounds of the lower legs and he will see patient in the morning, does not recommend anticoagulation at this point time unless Dopplers of lower extremities come back positive for DVT..         [facility], [place], Emergency Department    [address] [place] [address]    Phone:  [phone]                                       Xiang Pavon   MRN: [id number]    Department:  [facility], Emergency Department   Date of Visit:  9/4/2017           After Visit Summary Signature Page     I have received my discharge instructions, and my questions have been answered. I have discussed any challenges I see with this plan with the nurse or doctor.    ..........................................................................................................................................  Patient/Patient Representative Signature      ..........................................................................................................................................  Patient Representative Print Name and Relationship to Patient    ..................................................               ................................................  Date                                            Time    ..........................................................................................................................................  Reviewed by Signature/Title    ...................................................              ..............................................  Date                                                            Time
